# Patient Record
Sex: FEMALE | Race: WHITE
[De-identification: names, ages, dates, MRNs, and addresses within clinical notes are randomized per-mention and may not be internally consistent; named-entity substitution may affect disease eponyms.]

---

## 2019-12-27 ENCOUNTER — HOSPITAL ENCOUNTER (OUTPATIENT)
Dept: HOSPITAL 92 - SCSULT | Age: 34
Discharge: HOME | End: 2019-12-27
Attending: FAMILY MEDICINE
Payer: COMMERCIAL

## 2019-12-27 DIAGNOSIS — K82.4: Primary | ICD-10-CM

## 2019-12-27 PROCEDURE — 76705 ECHO EXAM OF ABDOMEN: CPT

## 2019-12-27 NOTE — ULT
RIGHT UPPER QUADRANT ULTRASOUND:

 

INDICATIONS:

Gallbladder polyp.

 

COMPARISON:

None.

 

FINDINGS:

No focal hepatic lesion. There is slight increased echogenicity of the hepatic parenchyma. There is a
 focus of increased echogenicity, 7 mm, along the gallbladder wall, reported as immobile by the sonog
rapher during the exam. The common duct is normal in caliber. No evidence of ascites.

 

IMPRESSION:

1. Findings most consistent with a 7 mm gallbladder polyp. Recommend followup with gastroenterology c
onsultation for continued clinical surveillance.

 

2. Slight increased echogenicity of the hepatic parenchyma, which may relate to hepatic steatosis.  C
orrelate with liver function enzymes.

 

POS: C

## 2022-09-01 ENCOUNTER — HOSPITAL ENCOUNTER (OUTPATIENT)
Dept: HOSPITAL 92 - CTENTCT | Age: 37
Discharge: HOME | End: 2022-09-01
Attending: OTOLARYNGOLOGY
Payer: COMMERCIAL

## 2022-09-01 DIAGNOSIS — J32.9: Primary | ICD-10-CM

## 2022-09-01 PROCEDURE — 70486 CT MAXILLOFACIAL W/O DYE: CPT

## 2023-04-25 ENCOUNTER — HOSPITAL ENCOUNTER (OUTPATIENT)
Dept: HOSPITAL 92 - ULT | Age: 38
Discharge: HOME | End: 2023-04-25
Attending: FAMILY MEDICINE
Payer: COMMERCIAL

## 2023-04-25 DIAGNOSIS — K82.4: Primary | ICD-10-CM

## 2023-04-25 DIAGNOSIS — R16.0: ICD-10-CM

## 2023-04-25 PROCEDURE — 76705 ECHO EXAM OF ABDOMEN: CPT

## 2023-05-08 ENCOUNTER — HOSPITAL ENCOUNTER (OUTPATIENT)
Dept: HOSPITAL 92 - SCSMRI | Age: 38
Discharge: HOME | End: 2023-05-08
Attending: FAMILY MEDICINE
Payer: COMMERCIAL

## 2023-05-08 DIAGNOSIS — K82.4: ICD-10-CM

## 2023-05-08 DIAGNOSIS — K83.8: Primary | ICD-10-CM

## 2023-05-08 PROCEDURE — 74183 MRI ABD W/O CNTR FLWD CNTR: CPT
